# Patient Record
Sex: FEMALE | Race: WHITE | Employment: STUDENT | URBAN - METROPOLITAN AREA
[De-identification: names, ages, dates, MRNs, and addresses within clinical notes are randomized per-mention and may not be internally consistent; named-entity substitution may affect disease eponyms.]

---

## 2018-10-03 ENCOUNTER — TELEPHONE (OUTPATIENT)
Dept: FAMILY MEDICINE CLINIC | Facility: CLINIC | Age: 23
End: 2018-10-03

## 2018-10-04 ENCOUNTER — OFFICE VISIT (OUTPATIENT)
Dept: FAMILY MEDICINE CLINIC | Facility: CLINIC | Age: 23
End: 2018-10-04

## 2018-10-04 VITALS
BODY MASS INDEX: 19.84 KG/M2 | RESPIRATION RATE: 16 BRPM | HEIGHT: 63 IN | WEIGHT: 112 LBS | TEMPERATURE: 97 F | HEART RATE: 84 BPM | DIASTOLIC BLOOD PRESSURE: 60 MMHG | SYSTOLIC BLOOD PRESSURE: 96 MMHG

## 2018-10-04 DIAGNOSIS — Z91.018 FOOD ALLERGY: ICD-10-CM

## 2018-10-04 DIAGNOSIS — Z00.00 ROUTINE PHYSICAL EXAMINATION: Primary | ICD-10-CM

## 2018-10-04 DIAGNOSIS — Z11.51 SCREENING FOR HUMAN PAPILLOMAVIRUS (HPV): ICD-10-CM

## 2018-10-04 PROCEDURE — 88175 CYTOPATH C/V AUTO FLUID REDO: CPT | Performed by: FAMILY MEDICINE

## 2018-10-04 PROCEDURE — 99385 PREV VISIT NEW AGE 18-39: CPT | Performed by: FAMILY MEDICINE

## 2018-10-04 RX ORDER — LEVONORGESTREL AND ETHINYL ESTRADIOL 0.1-0.02MG
1 KIT ORAL DAILY
Qty: 1 PACKAGE | Refills: 11 | Status: SHIPPED | OUTPATIENT
Start: 2018-10-04 | End: 2019-03-19

## 2018-10-04 NOTE — PROGRESS NOTES
HPI:   Sanchez Gunn is a 26year old female who presents for a complete physical exam.  New to the office  Last pap:  No previous  Last mammogram:  n/a   Self exams:  /  Menses:  regular  Contraception:  none  Previous colonoscopy:  n/a  Previous DEXA: GENERAL: feels well otherwise  SKIN: denies any unusual skin lesions  EYES:no vision problems  LUNGS: denies shortness of breath  CARDIOVASCULAR: denies chest pain  GI: denies abdominal pain,  No constipation or diarrhea  : denies dysuria, vaginal disc with HPV Reflex Request      Meds & Refills for this Visit:  Requested Prescriptions     Signed Prescriptions Disp Refills   • Levonorgestrel-Ethinyl Estrad 0.1-20 MG-MCG Oral Tab 1 Package 11     Sig: Take 1 tablet by mouth daily.        Imaging & Consults

## 2018-10-05 ENCOUNTER — LAB ENCOUNTER (OUTPATIENT)
Dept: LAB | Age: 23
End: 2018-10-05
Attending: FAMILY MEDICINE
Payer: COMMERCIAL

## 2018-10-05 DIAGNOSIS — Z91.018 FOOD ALLERGY: ICD-10-CM

## 2018-10-05 PROCEDURE — 86003 ALLG SPEC IGE CRUDE XTRC EA: CPT

## 2018-10-05 PROCEDURE — 36415 COLL VENOUS BLD VENIPUNCTURE: CPT

## 2018-10-11 ENCOUNTER — TELEPHONE (OUTPATIENT)
Dept: FAMILY MEDICINE CLINIC | Facility: CLINIC | Age: 23
End: 2018-10-11

## 2018-10-11 NOTE — TELEPHONE ENCOUNTER
This AM noticed crusty right eye, that itches called [facility] told to go get saline solution no contacts, I told her that the best would be to go to a [facility] in the area she agreed to this plan

## 2018-10-11 NOTE — TELEPHONE ENCOUNTER
Patient calling from school in [place]. She believes she has pink eye. I asked her if she had contacted the health office at school she said she did but she were not helpful. She is wondering if she can get a script?   Please call back at [phone]

## 2018-10-16 DIAGNOSIS — Z91.018: Primary | ICD-10-CM

## 2018-10-16 RX ORDER — EPINEPHRINE 0.3 MG/.3ML
0.3 INJECTION SUBCUTANEOUS ONCE
Qty: 1 EACH | Refills: 0 | Status: SHIPPED | OUTPATIENT
Start: 2018-10-16 | End: 2018-10-16

## 2018-11-20 ENCOUNTER — TELEPHONE (OUTPATIENT)
Dept: FAMILY MEDICINE CLINIC | Facility: CLINIC | Age: 23
End: 2018-11-20

## 2018-11-20 DIAGNOSIS — Z91.018: ICD-10-CM

## 2018-11-20 DIAGNOSIS — L29.9 ITCHING: ICD-10-CM

## 2018-11-20 DIAGNOSIS — L23.9 ALLERGIC CONTACT DERMATITIS, UNSPECIFIED TRIGGER: Primary | ICD-10-CM

## 2018-11-20 NOTE — TELEPHONE ENCOUNTER
Pt's father called to ask if Dr Yokasta Martinez would order additional allergy labs to check for other possible allergies. Enviromental and food allergy Panel? Pt test positive to barley already. Pt father states Pt feels like she may be allergic to something else.

## 2018-11-27 ENCOUNTER — TELEPHONE (OUTPATIENT)
Dept: FAMILY MEDICINE CLINIC | Facility: CLINIC | Age: 23
End: 2018-11-27

## 2018-11-27 DIAGNOSIS — L70.9 ACNE, UNSPECIFIED ACNE TYPE: Primary | ICD-10-CM

## 2018-11-27 NOTE — TELEPHONE ENCOUNTER
Referral request to Dr. Analy Caldwell M.D.,Dermatology    Patient last seen by Dr. Eduar Parmar M.D. 10/4/2018  Office notes from Dr. Eduar Parmar M.D. 10/4/2018  Would like to consider ocps to control acne.   Has tried different products with derm

## 2018-11-27 NOTE — TELEPHONE ENCOUNTER
Left message on patient's mobile phone number that referral has been entered for Dr. Katerina Velasquez M.D.,dermatology

## 2018-12-20 ENCOUNTER — TELEPHONE (OUTPATIENT)
Dept: FAMILY MEDICINE CLINIC | Facility: CLINIC | Age: 23
End: 2018-12-20

## 2018-12-20 DIAGNOSIS — Z91.018 FOOD ALLERGY: Primary | ICD-10-CM

## 2018-12-20 NOTE — TELEPHONE ENCOUNTER
Referral request Dr. Lewis Quick M.D., Allergy and Immunology    Patient was seen by Dr. Houston Rutherford M.D. 10/4/2018  Office notes from Dr. Houston Rutherford M.D. 10/4/18  Would like allergy testing. Believes she has reaction to barley.  Had sip of IPA be

## 2019-02-12 ENCOUNTER — TELEPHONE (OUTPATIENT)
Dept: FAMILY MEDICINE CLINIC | Facility: CLINIC | Age: 24
End: 2019-02-12

## 2019-02-12 NOTE — TELEPHONE ENCOUNTER
Received medical records request from patient requesting records be sent to Erlin Gonzlaes M.D., [facility].. Records were printed and sent to Erlin Gonzales M.D., [facility] at [phone]

## 2019-05-31 RX ORDER — NORGESTIMATE AND ETHINYL ESTRADIOL 0.25-0.035
1 KIT ORAL DAILY
Qty: 3 PACKAGE | Refills: 2 | Status: CANCELLED | OUTPATIENT
Start: 2019-05-31

## 2019-09-30 RX ORDER — NORGESTIMATE AND ETHINYL ESTRADIOL 0.25-0.035
1 KIT ORAL DAILY
Qty: 1 PACKAGE | Refills: 0 | Status: SHIPPED | OUTPATIENT
Start: 2019-09-30 | End: 2019-10-22

## 2019-11-13 ENCOUNTER — TELEPHONE (OUTPATIENT)
Dept: FAMILY MEDICINE CLINIC | Facility: CLINIC | Age: 24
End: 2019-11-13

## 2019-11-13 DIAGNOSIS — Z13.0 SCREENING FOR BLOOD DISEASE: Primary | ICD-10-CM

## 2019-11-13 DIAGNOSIS — Z13.228 SCREENING FOR METABOLIC DISORDER: ICD-10-CM

## 2019-11-13 DIAGNOSIS — Z13.220 SCREENING FOR LIPID DISORDERS: ICD-10-CM

## 2019-11-13 DIAGNOSIS — Z13.29 SCREENING FOR THYROID DISORDER: ICD-10-CM

## 2019-11-13 RX ORDER — NORGESTIMATE AND ETHINYL ESTRADIOL 0.25-0.035
1 KIT ORAL
Qty: 28 TABLET | Refills: 0 | Status: SHIPPED | OUTPATIENT
Start: 2019-11-13 | End: 2019-11-23

## 2019-11-13 NOTE — TELEPHONE ENCOUNTER
Patient called and scheduled annual physical for 12/27 with Dr Ziyad Lester, states is away at school and would need a refill to get to appointment for her birth control

## 2019-11-13 NOTE — TELEPHONE ENCOUNTER
Please enter lab orders for the patient's upcoming physical appointment. Physical scheduled:    Your appointments     Date & Time Appointment Department [facility])    Dec 27, 2019  9:30 AM CST Physical - Established with Violeta Gibbs  [facility]

## 2019-11-25 ENCOUNTER — TELEPHONE (OUTPATIENT)
Dept: FAMILY MEDICINE CLINIC | Facility: CLINIC | Age: 24
End: 2019-11-25

## 2019-11-25 DIAGNOSIS — L70.9 ACNE, UNSPECIFIED ACNE TYPE: Primary | ICD-10-CM

## 2019-11-25 NOTE — TELEPHONE ENCOUNTER
Referral request Dr. Gabo Lantigua M.D.,Dermatology    Patient seen by Dr. Qamar Blevins M.D. 10/4/18  Office notes from Dr. Qamar Blevins M.D. 10/4/18  Would like to consider ocps to control acne.

## 2019-11-25 NOTE — TELEPHONE ENCOUNTER
Patient notified we have placed referral for Dr. Dean Chadwick M.D. She has an annual physical scheduled with Dr. Mague Looney M.D.in December.

## 2019-12-23 NOTE — TELEPHONE ENCOUNTER
Requested Prescriptions     Pending Prescriptions Disp Refills   • [id number] [id number] 0.25-35 MG-MCG Oral Tab [Pharmacy Med Name: SPRINTEC 28 DAY TABLET] 28 tablet 0     Sig: TAKE 1 TABLET BY MOUTH EVERY DAY     LOV 10/04/2018      Patient was asked to follow-up

## 2019-12-27 ENCOUNTER — OFFICE VISIT (OUTPATIENT)
Dept: FAMILY MEDICINE CLINIC | Facility: CLINIC | Age: 24
End: 2019-12-27

## 2019-12-27 ENCOUNTER — LAB ENCOUNTER (OUTPATIENT)
Dept: LAB | Age: 24
End: 2019-12-27
Attending: NURSE PRACTITIONER
Payer: COMMERCIAL

## 2019-12-27 VITALS
RESPIRATION RATE: 16 BRPM | BODY MASS INDEX: 20.65 KG/M2 | WEIGHT: 118 LBS | HEIGHT: 63.5 IN | TEMPERATURE: 98 F | HEART RATE: 72 BPM | SYSTOLIC BLOOD PRESSURE: 96 MMHG | DIASTOLIC BLOOD PRESSURE: 66 MMHG

## 2019-12-27 DIAGNOSIS — Z13.0 SCREENING FOR BLOOD DISEASE: ICD-10-CM

## 2019-12-27 DIAGNOSIS — Z13.228 SCREENING FOR METABOLIC DISORDER: ICD-10-CM

## 2019-12-27 DIAGNOSIS — Z13.29 SCREENING FOR THYROID DISORDER: ICD-10-CM

## 2019-12-27 DIAGNOSIS — Z00.00 ROUTINE GENERAL MEDICAL EXAMINATION AT A HEALTH CARE FACILITY: Primary | ICD-10-CM

## 2019-12-27 DIAGNOSIS — Z13.220 SCREENING FOR LIPID DISORDERS: ICD-10-CM

## 2019-12-27 PROCEDURE — 85025 COMPLETE CBC W/AUTO DIFF WBC: CPT

## 2019-12-27 PROCEDURE — 90471 IMMUNIZATION ADMIN: CPT | Performed by: FAMILY MEDICINE

## 2019-12-27 PROCEDURE — 84443 ASSAY THYROID STIM HORMONE: CPT

## 2019-12-27 PROCEDURE — 90686 IIV4 VACC NO PRSV 0.5 ML IM: CPT | Performed by: FAMILY MEDICINE

## 2019-12-27 PROCEDURE — 36415 COLL VENOUS BLD VENIPUNCTURE: CPT

## 2019-12-27 PROCEDURE — 80053 COMPREHEN METABOLIC PANEL: CPT

## 2019-12-27 PROCEDURE — 99395 PREV VISIT EST AGE 18-39: CPT | Performed by: FAMILY MEDICINE

## 2019-12-27 PROCEDURE — 80061 LIPID PANEL: CPT

## 2019-12-27 RX ORDER — NORGESTIMATE AND ETHINYL ESTRADIOL 0.25-0.035
1 KIT ORAL
Qty: 3 PACKAGE | Refills: 4 | Status: SHIPPED | OUTPATIENT
Start: 2019-12-27

## 2019-12-27 NOTE — PROGRESS NOTES
HPI:   Chika Buenrostro is a 27year old female who presents for a complete physical exam.  Last pap:  2018  Last mammogram:  n/a   Contraception:  None, not sexually active  Previous colonoscopy:  n/a  Previous DEXA:  n/a.   Current or previous treatment: dermatitis     Acne, unspecified acne type    No past medical history on file. No past surgical history on file.    Family History   Problem Relation Age of Onset   • Heart Disease Maternal Grandfather    • Hypertension Father    • Mental Disorder Materna control pills for now to see if periods regulate.   Orders Placed This Encounter      Flulaval 6 months and older 0.5 ml Quad PF [82059]      Meds & Refills for this Visit:  Requested Prescriptions     Signed Prescriptions Disp Refills   • Norgestimate-Eth

## 2020-01-10 ENCOUNTER — PATIENT MESSAGE (OUTPATIENT)
Dept: FAMILY MEDICINE CLINIC | Facility: CLINIC | Age: 25
End: 2020-01-10

## 2020-01-10 NOTE — TELEPHONE ENCOUNTER
From: Cristo Obregon  To: Robbie Mi MD  Sent: 1/10/2020 2:34 PM CST  Subject: Non-Urgent Karis Pride,    I just got my period again & I'm in the middle of a pack.  I remember you saying something about ending a pack if that

## 2020-03-03 ENCOUNTER — TELEPHONE (OUTPATIENT)
Dept: FAMILY MEDICINE CLINIC | Facility: CLINIC | Age: 25
End: 2020-03-03

## 2020-03-03 DIAGNOSIS — Z30.9 ENCOUNTER FOR CONTRACEPTIVE MANAGEMENT, UNSPECIFIED TYPE: ICD-10-CM

## 2020-03-03 RX ORDER — NORGESTIMATE AND ETHINYL ESTRADIOL 7DAYSX3 28
1 KIT ORAL DAILY
Qty: 84 TABLET | Refills: 0 | Status: CANCELLED | OUTPATIENT
Start: 2020-03-03 | End: 2020-03-31

## 2020-03-03 RX ORDER — NORGESTIMATE AND ETHINYL ESTRADIOL 7DAYSX3 28
1 KIT ORAL DAILY
Qty: 84 TABLET | Refills: 0 | Status: SHIPPED | OUTPATIENT
Start: 2020-03-03 | End: 2020-03-31

## 2020-03-03 NOTE — TELEPHONE ENCOUNTER
Pt called stated she had requested birth control medication to be sent to [id number] [place] [address] and medication was sent in error to [place].  Please send Norgestim-Eth Estrad Triphasic (ORTHO TRI-CYCLEN, 28,) 0.18/0.215/0.25 MG-35 MCG Oral Tab to [facility]

## 2020-04-23 DIAGNOSIS — Z30.9 ENCOUNTER FOR CONTRACEPTIVE MANAGEMENT, UNSPECIFIED TYPE: ICD-10-CM

## 2020-04-23 RX ORDER — NORGESTIMATE AND ETHINYL ESTRADIOL 7DAYSX3 28
1 KIT ORAL DAILY
Qty: 84 TABLET | Refills: 0 | OUTPATIENT
Start: 2020-04-23 | End: 2020-05-21

## 2020-04-23 NOTE — TELEPHONE ENCOUNTER
Reviewed Medications Dispensed list  Patient has already picked up on 3/3/20 84 day supply  TRI-SPRINTEC 0.18/0.215/0.25 MG-35 TRI-SPRINTEC 0.18/0.215/0.25 MG-35 MCG TABS

## 2020-04-23 NOTE — TELEPHONE ENCOUNTER
Hello! I think you sent this in a month or so ago, but I was never able to pick it up. I was wondering if you could send it in again so I could start it? Thanks!   Jeanne Taylor

## 2020-11-23 ENCOUNTER — TELEPHONE (OUTPATIENT)
Dept: FAMILY MEDICINE CLINIC | Facility: CLINIC | Age: 25
End: 2020-11-23

## 2020-11-23 DIAGNOSIS — L70.8 OTHER ACNE: ICD-10-CM

## 2020-11-23 DIAGNOSIS — L25.9 CHRONIC CONTACT DERMATITIS: Primary | ICD-10-CM

## 2020-11-23 NOTE — TELEPHONE ENCOUNTER
Referral request Dr. Johnnie Pena M.D.     Patient seen by Dr. Humphrey Maldonado M.D. 12/27/2019  Patient Active Problem List:     Contact dermatitis     Acne, unspecified acne type

## 2021-12-15 ENCOUNTER — TELEPHONE (OUTPATIENT)
Dept: FAMILY MEDICINE CLINIC | Facility: CLINIC | Age: 26
End: 2021-12-15

## 2021-12-15 DIAGNOSIS — L81.0 POSTINFLAMMATORY HYPERPIGMENTATION: ICD-10-CM

## 2021-12-15 DIAGNOSIS — L70.0 ACNE VULGARIS: Primary | ICD-10-CM

## 2021-12-15 NOTE — TELEPHONE ENCOUNTER
Referral request Dr. Karen Rodarte M.D.     Office notes from [place], [place] 5/12/21  1. Adult Acne to the Face - Few small papules with 1 cyst to the chin today  -The diagnosis and treatment options were discussed and questions were answered.   -Risks, benefits

## 2021-12-23 ENCOUNTER — LAB ENCOUNTER (OUTPATIENT)
Dept: LAB | Facility: HOSPITAL | Age: 26
End: 2021-12-23
Attending: FAMILY MEDICINE
Payer: COMMERCIAL

## 2021-12-23 ENCOUNTER — TELEPHONE (OUTPATIENT)
Dept: FAMILY MEDICINE CLINIC | Facility: CLINIC | Age: 26
End: 2021-12-23

## 2021-12-23 DIAGNOSIS — R09.81 NASAL CONGESTION: Primary | ICD-10-CM

## 2021-12-23 DIAGNOSIS — R09.81 NASAL CONGESTION: ICD-10-CM

## 2021-12-23 NOTE — TELEPHONE ENCOUNTER
Mom called back stating she insist Pt just get an order for Covid testing. Explained to mom that Pt has not been in over in 2 years and also has sinus issues- symptoms may be sinus infection or covid and advise an evaluation for testing and treatment.    Licha Lang

## 2021-12-23 NOTE — TELEPHONE ENCOUNTER
Pt having runny nose and headache (since last night) Requesting an order for Covid (no appts available)

## 2021-12-23 NOTE — TELEPHONE ENCOUNTER
Pt's mom calling back and requesting to speak to a nurse on message given to her daughter for Covid.  ([phone])

## 2021-12-23 NOTE — TELEPHONE ENCOUNTER
Pt c/o runny nose, congestion and headaches x 2 days. No fevers, no difficulty breathing.  Advised Pt to go to Walk In clinic for evaluation

## 2022-01-19 NOTE — TELEPHONE ENCOUNTER
[address]     Department of Internal Medicine - Staff Internal Medicine Teaching Service          ADMISSION NOTE/HISTORY AND PHYSICAL EXAMINATION   Date: 1/19/2022  Patient Name: Carolina Murphy  Date of admission: 1/18/2022  9:03 PM  YOB: 1935  PCP: Hayden Archibald MD  History Obtained From:  Patient CHART REVIEW     [address]     Chief complaint: worsening sob     HISTORY OF PRESENTING ILLNESS     The patient is a pleasant 82 y.o. female with past medical history of CAD s/p PTCA/PEPITO to LAD on 10/8/2012  EF 45, thyroid disease, hypertension, diabetes, hyperlipidemia. Was recently admitted for lightheadedness/fatigue and found to have COVID and  UTI which was  with Keflex. Patient was discharged on January 11 with covid boostwer      PER ED notes ,patient was having vomiting ,diarehea  and failure to thrive at home and was brought in to hospital .in the ED patient was hypoxic 88 % and was put on 2 l nc with improvement of oxygen to >92 .   hemodynamically stable with bp in 116/52, afebrile, rr 20    On my evaluation at around 6 :00 am .,patient was wearing bipap . Patient stated she usully uses 3 l of oxygen at home for her emphysema and that wasn't helping her  So she had to come to hospital .   Patient denied any chest pain, cough,phlegm ,diarrhea,constipation ,flu like symptoms. CXR did show worsening of pulmonary opacities as compared to cxr on jan 8th    Review of Systems:  Review of Systems   Respiratory: Positive for shortness of breath. Negative for cough, wheezing and stridor. Cardiovascular: Positive for leg swelling. Negative for chest pain. Gastrointestinal: Negative for abdominal pain.          PAST MEDICAL HISTORY     Past Medical History:   Diagnosis Date    Arthritis     [facility]rial flutter [facility]     CAD (coronary artery disease)     CHF (congestive heart failure) (HCC)     Diabetes ([facility]     Diabetes mellitus ([facility]     Hyperlipidemia     LMOM for pt to call back and confirm her NEW PT appt w/Dr Jean-Claude Sandoval on 10/4/18 Hypertension     Psychiatric problem     Thyroid cancer ([facility]     S/P thyroidectomy; on synthroid    Thyroid disease     hypothyroid       PAST SURGICAL HISTORY     Past Surgical History:   Procedure Laterality Date    APPENDECTOMY      BACK SURGERY      CHOLECYSTECTOMY      CORONARY ANGIOPLASTY WITH STENT PLACEMENT      HYSTERECTOMY      THYROIDECTOMY      UPPER GASTROINTESTINAL ENDOSCOPY  4/14/2021    EGD BIOPSY performed by Dang Roman MD at [facility] ENDOSCOPY  4/14/2021    EGD CONTROL HEMORRHAGE performed by Dang Roman MD at [facility] extract and Tape [place] tape]    MEDICATIONS PRIOR TO ADMISSION     Prior to Admission medications    Medication Sig Start Date End Date Taking?  Authorizing Provider   pantoprazole (PROTONIX) 40 MG tablet Take 1 tablet by mouth daily 1/11/22   Yunior Agustin MD   levothyroxine (SYNTHROID) 150 MCG tablet Take 1 tablet by mouth Daily 1/12/22   Yunior Agustin MD   furosemide (LASIX) 40 MG tablet Take 0.5 tablets by mouth daily 1/11/22   Yunior Agustin MD   metoprolol tartrate (LOPRESSOR) 25 MG tablet Take 0.5 tablets by mouth 2 times daily 1/11/22   Yunior Ba MD   FEROSUL 325 (65 Fe) MG tablet TAKE 1 TABLET BY MOUTH 2 TIMES DAILY 12/17/21   Lupe Jones MD   insulin glargine (BASAGLAR KWIKPEN) 100 UNIT/ML injection pen Inject 42 Units into the skin nightly 12/8/21   STEFAN Goodwin CNP   Continuous Blood Gluc Sensor (FREESTYLE KERRY 2 SENSOR) MISC 1 Device by Does not apply route every 14 days 11/23/21   STEFAN Goodwin CNP   Continuous Blood Gluc  (FREESTYLE KERRY 2 READER) CLAUDIO 1 Device by Does not apply route daily 11/23/21   STEFAN Goodwin CNP   Cyanocobalamin (VITAMIN B-12) 1000 MCG extended release tablet TAKE 1 TAB BY MOUTH ONCE A DAY  10/28/21   Lupe Jones MD   atorvastatin (LIPITOR) 80 MG tablet TAKE 1 TABLET BY MOUTH DAILY  8/10/21   Evelina Christopher MD   vitamin D3 (CHOLECALCIFEROL) 25 MCG (1000 UT) TABS tablet TAKE 1 TAB BY MOUTH ONCE A DAY  8/10/21   Evelina Christopher MD   clopidogrel (PLAVIX) 75 MG tablet TAKE 1 TAB BY MOUTH ONCE A DAY 7/29/21   Evelina Christopher MD   loratadine (CLARITIN) 10 MG tablet TAKE 1 TABLET BY MOUTH TWICE A DAY AS NEEDED 7/8/21   Randal Hand MD   sertraline (ZOLOFT) 100 MG tablet Take 1 tablet by Mouth Once a Day 6/17/21   Evelina Christopher MD   SITagliptin (JANUVIA) 100 MG tablet TAKE 1 TAB BY MOUTH ONCE A DAY 6/3/21   Evelina Christopher MD   blood glucose monitor strips Test 2 times a day & as needed for symptoms of irregular blood glucose. Dispense sufficient amount for indicated testing frequency plus additional to accommodate PRN testing needs. 3/29/21   Evelina Christopher MD   Continuous Blood Gluc  (FREESTYLE KERRY 14 DAY READER) CLAUDIO 1 Device by Does not apply route 4 times daily 11/30/20   Evelina Christopher MD   Continuous Blood Gluc Sensor (FREESTYLE KERRY 2 SENSOR SYSTM) MISC 4 Devices by Does not apply route every 7 days 11/30/20   Evelina Christopher MD   Lancets MISC 1 each by Does not apply route 2 times daily 7/5/19   Evelina Christopher MD   nystatin (MYCOSTATIN) 579995 UNIT/GM cream Apply topically 2 times daily.  5/7/18   Evelina Christopher MD   docusate sodium (COLACE) 100 MG capsule Take 1 capsule by mouth daily as needed for Constipation 8/18/17   Evelina Christopher MD   aspirin 81 MG chewable tablet Take 1 tablet by mouth daily 9/5/16   Lorraine Garcia MD       SOCIAL HISTORY     Tobacco: none  Alcohol: none  Illicits:none   Per chart [facility]    FAMILY HISTORY     Family History   Problem Relation Age of Onset    Cancer Mother     Cancer Father     Cancer Brother     Diabetes Brother        PHYSICAL EXAM     Vitals: BP (!) 113/53   Pulse 58   Temp 98 °F (36.7 °C) (Oral)   Resp 18   Ht 5' 2\" (1.575 m)   Wt 200 lb (90.7 kg)   SpO2 97%   BMI 36.58 kg/m²   Tmax: Temp (24hrs), Av.7 °F (36.5 °C), Min:97.5 °F (36.4 °C), Max:98 °F (36.7 °C)    Last Body weight:   Wt Readings from Last 3 Encounters:   22 200 lb (90.7 kg)   22 199 lb 4.7 oz (90.4 kg)   21 200 lb (90.7 kg)     Body Mass Index : Body mass index is 36.58 kg/m². PHYSICAL EXAMINATION:  Constitutional: This is a well developed, well nourished, 35-39.9 - Obesity Grade II 82y.o. year old female who is alert, oriented, cooperative and in no apparent distress. Head:normocephalic and atraumatic. EENT:  PERRLA. No conjunctival injections. Septum was midline, mucosa was without erythema, exudates or cobblestoning. No thrush was noted. Neck: Supple without thyromegaly. No elevated JVP. Trachea was midline. Respiratory: chest decrease sound overall likely due to body habitous ,on bipap   Cardiovascular: Regular without murmur, clicks, gallops or rubs. Abdomen: Slightly rounded and soft without organomegaly. No rebound, rigidity or guarding was appreciated. Lymphatic: No lymphadenopathy. Musculoskeletal: Normal curvature of the spine. No gross muscle weakness. Extremities:  Pedal edema   Skin:  Warm and dry. Good color, turgor and pigmentation. No lesions or scars.   No cyanosis or clubbing  Neurological/Psychiatric: The patient's general behavior, level of consciousness, thought content and emotional status is normal.          INVESTIGATIONS     Laboratory Testing:     Recent Results (from the past 24 hour(s))   CBC Auto Differential    Collection Time: 22  9:12 PM   Result Value Ref Range    WBC 6.4 3.5 - 11.3 k/uL    RBC 4.26 3.95 - 5.11 m/uL    Hemoglobin 11.9 11.9 - 15.1 g/dL    Hematocrit 36.2 (L) 36.3 - 47.1 %    MCV 85.0 82.6 - 102.9 fL    MCH 27.9 25.2 - 33.5 pg    MCHC 32.9 28.4 - 34.8 g/dL    RDW 14.1 11.8 - 14.4 %    Platelets See Reflexed IPF Result 138 - 453 k/uL    MPV NOT REPORTED 8.1 - 13.5 fL    NRBC Automated 0.0 0.0 per 100 WBC Differential Type NOT REPORTED     WBC Morphology NOT REPORTED     RBC Morphology NOT REPORTED     Platelet Estimate NOT REPORTED     Immature Granulocytes 5 (H) 0 %    Seg Neutrophils 68 (H) 36 - 66 %    Lymphocytes 21 (L) 24 - 44 %    Monocytes 6 1 - 7 %    Eosinophils % 0 (L) 1 - 4 %    Basophils 0 0 - 2 %    Absolute Immature Granulocyte 0.32 (H) 0.00 - 0.30 k/uL    Segs Absolute 4.36 1.8 - 7.7 k/uL    Absolute Lymph # 1.34 1.0 - 4.8 k/uL    Absolute Mono # 0.38 0.1 - 0.8 k/uL    Absolute Eos # 0.00 0.0 - 0.4 k/uL    Basophils Absolute 0.00 0.0 - 0.2 k/uL    Morphology Normal    Basic Metabolic Panel w/ Reflex to MG    Collection Time: 01/18/22  9:12 PM   Result Value Ref Range    Glucose 226 (H) 70 - 99 mg/dL    BUN 26 (H) 8 - 23 mg/dL    CREATININE 1.34 (H) 0.50 - 0.90 mg/dL    Bun/Cre Ratio NOT REPORTED 9 - 20    Calcium 8.1 (L) 8.6 - 10.4 mg/dL    Sodium 132 (L) 135 - 144 mmol/L    Potassium 4.1 3.7 - 5.3 mmol/L    Chloride 94 (L) 98 - 107 mmol/L    CO2 23 20 - 31 mmol/L    Anion Gap 15 9 - 17 mmol/L    GFR Non-African American 38 (L) >60 mL/min    GFR  46 (L) >60 mL/min    GFR Comment          GFR Staging NOT REPORTED    Hepatic Function Panel    Collection Time: 01/18/22  9:12 PM   Result Value Ref Range    Albumin 3.6 3.5 - 5.2 g/dL    Alkaline Phosphatase 164 (H) 35 - 104 U/L    ALT 36 (H) 5 - 33 U/L    AST 39 (H) <32 U/L    Total Bilirubin 0.49 0.3 - 1.2 mg/dL    Bilirubin, Direct 0.17 <0.31 mg/dL    Bilirubin, Indirect 0.32 0.00 - 1.00 mg/dL    Total Protein 7.2 6.4 - 8.3 g/dL    Globulin NOT REPORTED 1.5 - 3.8 g/dL    Albumin/Globulin Ratio 1.0 1.0 - 2.5   Troponin    Collection Time: 01/18/22  9:12 PM   Result Value Ref Range    Troponin, High Sensitivity 31 (H) 0 - 14 ng/L    Troponin T NOT REPORTED <0.03 ng/mL    Troponin Interp NOT REPORTED    Brain Natriuretic Peptide    Collection Time: 01/18/22  9:12 PM   Result Value Ref Range    Pro- (H) <300 pg/mL    BNP Interpretation NOT REPORTED    Immature Platelet Fraction    Collection Time: 01/18/22  9:12 PM   Result Value Ref Range    Platelet, Immature Fraction 3.9 1.1 - 10.3 %    Platelet, Fluorescence 187 138 - 453 k/uL   EKG 12 Lead    Collection Time: 01/18/22  9:14 PM   Result Value Ref Range    Ventricular Rate 67 BPM    Atrial Rate 67 BPM    P-R Interval 232 ms    QRS Duration 162 ms    Q-T Interval 466 ms    QTc Calculation (Bazett) 492 ms    P Axis 23 degrees    R Axis -74 degrees    T Axis 38 degrees   Troponin    Collection Time: 01/18/22 11:01 PM   Result Value Ref Range    Troponin, High Sensitivity 27 (H) 0 - 14 ng/L    Troponin T NOT REPORTED <0.03 ng/mL    Troponin Interp NOT REPORTED    Ferritin    Collection Time: 01/19/22  2:33 AM   Result Value Ref Range    Ferritin 243 (H) 13 - 150 ug/L   D-Dimer, Quantitative    Collection Time: 01/19/22  2:33 AM   Result Value Ref Range    D-Dimer, Quant 1.42 mg/L FEU       Imaging:   XR CHEST PORTABLE    Result Date: 1/18/2022  1. New bilateral pulmonary opacities, consistent with COVID-19 pulmonary disease given the clinical history 2. Cardiomegaly     XR HIP 2-3 VW W PELVIS RIGHT    Result Date: 1/18/2022  No acute osseous abnormality       ASSESSMENT & PLAN     ASSESSMENT / PLAN:     1. Acute hypoxic respiratory failure secondary to COVID: New pulmonary opacities when compared to x-ray of January 8. Did not receive steroids,/Actemra/monoclonal antibodies during last admission. On 2 L nasal cannula. Follow D-dimer, ferritin, LDH, C-reactive protein, Pro-Kyle.  2. COVID superimposed PNA?: Follow respiratory culture, Legionella, strep. ID CONSULT   3. BRIA with baseline creatinine : Continue gentle oral hydration, intake output record . monitor electrolytes. 4. NSTEMI type II likely due to demand ischemia: Last echo 1/10/2022 not read. 2016 EF 65%. 5. History of UTI on previous admission: Completed dose of Keflex.   6. History of chronic diastolic heart failure grade 1 on 20 mg Lasix, Lopressor 25 at home, Lopressor 25.  7. History of type 2 diabetes on Lantus 42 units at home. Currently glucose stable. Will assess and add as appropriate  8. Hypertension on Lasix 20 mg and Lopressor 25 mg at home. Currently normotensive. Resume as tolerated. 9. Dyslipidemia on atorvastatin 80 mg  10. BHASKAR on cpap at home  11. Copd with baseline use of 3 l oxygen at home accoridng to patient . 12. history of CAD s/p PTCA/PEPITO to LAD on 10/8/2012 ; on aspirin plavix both ??  13. Hypothyroidism ; last tsh 15 on 1/8/2022 after which thyroxine dose was increased to 150      DVT ppx:hepairn   GI ppx: none     PT/OT/SW on board   Discharge Planning: on board     Shari Watkins MD  Internal Medicine Resident, PGY-2  [address] [place], [place]  1/19/2022, 5:14 AM

## 2022-03-28 PROBLEM — N64.52 NIPPLE DISCHARGE: Status: ACTIVE | Noted: 2020-09-28

## 2022-03-28 RX ORDER — NORGESTIMATE AND ETHINYL ESTRADIOL 7DAYSX3 28
KIT ORAL
COMMUNITY
Start: 2021-12-22

## 2022-05-25 ENCOUNTER — OFFICE VISIT (OUTPATIENT)
Dept: FAMILY MEDICINE CLINIC | Facility: CLINIC | Age: 27
End: 2022-05-25
Payer: COMMERCIAL

## 2022-05-25 VITALS
HEIGHT: 63.39 IN | RESPIRATION RATE: 16 BRPM | DIASTOLIC BLOOD PRESSURE: 60 MMHG | WEIGHT: 111 LBS | HEART RATE: 80 BPM | BODY MASS INDEX: 19.42 KG/M2 | TEMPERATURE: 97 F | OXYGEN SATURATION: 95 % | SYSTOLIC BLOOD PRESSURE: 90 MMHG

## 2022-05-25 DIAGNOSIS — Z00.00 WELL WOMAN EXAM (NO GYNECOLOGICAL EXAM): Primary | ICD-10-CM

## 2022-05-25 DIAGNOSIS — F43.21 GRIEF: ICD-10-CM

## 2022-05-25 DIAGNOSIS — N92.6 IRREGULAR MENSES: ICD-10-CM

## 2022-05-25 PROCEDURE — 3078F DIAST BP <80 MM HG: CPT | Performed by: NURSE PRACTITIONER

## 2022-05-25 PROCEDURE — 3008F BODY MASS INDEX DOCD: CPT | Performed by: NURSE PRACTITIONER

## 2022-05-25 PROCEDURE — 3074F SYST BP LT 130 MM HG: CPT | Performed by: NURSE PRACTITIONER

## 2022-05-25 PROCEDURE — 99395 PREV VISIT EST AGE 18-39: CPT | Performed by: NURSE PRACTITIONER

## 2022-06-07 ENCOUNTER — LABORATORY ENCOUNTER (OUTPATIENT)
Dept: LAB | Age: 27
End: 2022-06-07
Attending: NURSE PRACTITIONER
Payer: COMMERCIAL

## 2022-06-07 DIAGNOSIS — Z00.00 WELL WOMAN EXAM (NO GYNECOLOGICAL EXAM): ICD-10-CM

## 2022-06-07 LAB
ALBUMIN SERPL-MCNC: 3.5 G/DL (ref 3.4–5)
ALBUMIN/GLOB SERPL: 1 {RATIO} (ref 1–2)
ALP LIVER SERPL-CCNC: 41 U/L
ALT SERPL-CCNC: 17 U/L
ANION GAP SERPL CALC-SCNC: 5 MMOL/L (ref 0–18)
AST SERPL-CCNC: 10 U/L (ref 15–37)
BASOPHILS # BLD AUTO: 0.03 X10(3) UL (ref 0–0.2)
BASOPHILS NFR BLD AUTO: 0.6 %
BILIRUB SERPL-MCNC: 0.5 MG/DL (ref 0.1–2)
BUN BLD-MCNC: 9 MG/DL (ref 7–18)
CALCIUM BLD-MCNC: 9.4 MG/DL (ref 8.5–10.1)
CHLORIDE SERPL-SCNC: 106 MMOL/L (ref 98–112)
CO2 SERPL-SCNC: 28 MMOL/L (ref 21–32)
CREAT BLD-MCNC: 0.88 MG/DL
EOSINOPHIL # BLD AUTO: 0.14 X10(3) UL (ref 0–0.7)
EOSINOPHIL NFR BLD AUTO: 2.8 %
ERYTHROCYTE [DISTWIDTH] IN BLOOD BY AUTOMATED COUNT: 12.2 %
FASTING STATUS PATIENT QL REPORTED: YES
GLOBULIN PLAS-MCNC: 3.6 G/DL (ref 2.8–4.4)
GLUCOSE BLD-MCNC: 82 MG/DL (ref 70–99)
HCT VFR BLD AUTO: 43.2 %
HGB BLD-MCNC: 14.5 G/DL
IMM GRANULOCYTES # BLD AUTO: 0.01 X10(3) UL (ref 0–1)
IMM GRANULOCYTES NFR BLD: 0.2 %
LYMPHOCYTES # BLD AUTO: 2.23 X10(3) UL (ref 1–4)
LYMPHOCYTES NFR BLD AUTO: 44.8 %
MCH RBC QN AUTO: 31.8 PG (ref 26–34)
MCHC RBC AUTO-ENTMCNC: 33.6 G/DL (ref 31–37)
MCV RBC AUTO: 94.7 FL
MONOCYTES # BLD AUTO: 0.31 X10(3) UL (ref 0.1–1)
MONOCYTES NFR BLD AUTO: 6.2 %
NEUTROPHILS # BLD AUTO: 2.26 X10 (3) UL (ref 1.5–7.7)
NEUTROPHILS # BLD AUTO: 2.26 X10(3) UL (ref 1.5–7.7)
NEUTROPHILS NFR BLD AUTO: 45.4 %
OSMOLALITY SERPL CALC.SUM OF ELEC: 286 MOSM/KG (ref 275–295)
PLATELET # BLD AUTO: 264 10(3)UL (ref 150–450)
POTASSIUM SERPL-SCNC: 4.2 MMOL/L (ref 3.5–5.1)
PROT SERPL-MCNC: 7.1 G/DL (ref 6.4–8.2)
RBC # BLD AUTO: 4.56 X10(6)UL
SODIUM SERPL-SCNC: 139 MMOL/L (ref 136–145)
TSI SER-ACNC: 2.81 MIU/ML (ref 0.36–3.74)
WBC # BLD AUTO: 5 X10(3) UL (ref 4–11)

## 2022-06-07 PROCEDURE — 84443 ASSAY THYROID STIM HORMONE: CPT

## 2022-06-07 PROCEDURE — 36415 COLL VENOUS BLD VENIPUNCTURE: CPT

## 2022-06-07 PROCEDURE — 85025 COMPLETE CBC W/AUTO DIFF WBC: CPT

## 2022-06-07 PROCEDURE — 80053 COMPREHEN METABOLIC PANEL: CPT

## 2022-07-08 ENCOUNTER — PATIENT OUTREACH (OUTPATIENT)
Dept: FAMILY MEDICINE CLINIC | Facility: CLINIC | Age: 27
End: 2022-07-08

## 2022-07-08 NOTE — PROGRESS NOTES
Pt was seen 5/25/22 by Hever Post NP, was referred to Dr. Debo Allen for eval and due for PAP testing, which we discussed. Took last pill today. Did get better with blood in the urine while on antibiotic. But now regressing and noting more blood again. No burning with urination or to urethral area. Will see blood drip into the water, and on toilet. Noted blood with wiping. Per daughter, does seem like more substantial amount. Daughter unsure if maybe patient has a sore down there? No Sob, lightheaded/dizzy. Temp was good. O2 level, BP good. No abdominal pain. Advised Dr. Izquierdo is not in today, they are okay to wait for his return tomorrow to review. Advised to call back today if symptoms worsen.     To Dr. Izquierdo - please advise, patient to come in for appointment or at this point needs to see urology for possible cystoscopy?

## 2022-12-21 ENCOUNTER — TELEPHONE (OUTPATIENT)
Dept: FAMILY MEDICINE CLINIC | Facility: CLINIC | Age: 27
End: 2022-12-21

## 2022-12-21 NOTE — TELEPHONE ENCOUNTER
Left message for patient that mental health does not require a referral. As long as she sees an in network mental health provider she should be fine.

## 2023-01-27 ENCOUNTER — HOSPITAL ENCOUNTER (EMERGENCY)
Facility: HOSPITAL | Age: 28
Discharge: HOME | End: 2023-01-28
Attending: EMERGENCY MEDICINE
Payer: COMMERCIAL

## 2023-01-27 DIAGNOSIS — R42 VERTIGO: Primary | ICD-10-CM

## 2023-01-27 PROCEDURE — 93010 ELECTROCARDIOGRAM REPORT: CPT

## 2023-01-27 PROCEDURE — 99284 EMERGENCY DEPT VISIT MOD MDM: CPT

## 2023-01-27 PROCEDURE — 99285 EMERGENCY DEPT VISIT HI MDM: CPT

## 2023-01-28 ENCOUNTER — APPOINTMENT (OUTPATIENT)
Dept: CT IMAGING | Facility: HOSPITAL | Age: 28
End: 2023-01-28
Attending: EMERGENCY MEDICINE
Payer: COMMERCIAL

## 2023-01-28 VITALS
RESPIRATION RATE: 20 BRPM | HEIGHT: 63 IN | HEART RATE: 76 BPM | OXYGEN SATURATION: 99 % | TEMPERATURE: 98 F | BODY MASS INDEX: 20.37 KG/M2 | SYSTOLIC BLOOD PRESSURE: 101 MMHG | DIASTOLIC BLOOD PRESSURE: 73 MMHG | WEIGHT: 115 LBS

## 2023-01-28 LAB
ALBUMIN SERPL-MCNC: 3.8 G/DL (ref 3.4–5)
ALBUMIN/GLOB SERPL: 1.2 {RATIO} (ref 1–2)
ALP LIVER SERPL-CCNC: 48 U/L
ALT SERPL-CCNC: 22 U/L
AMPHET UR QL SCN: NEGATIVE
ANION GAP SERPL CALC-SCNC: 6 MMOL/L (ref 0–18)
AST SERPL-CCNC: 20 U/L (ref 15–37)
ATRIAL RATE: 72 BPM
B-HCG UR QL: NEGATIVE
BASOPHILS # BLD AUTO: 0.04 X10(3) UL (ref 0–0.2)
BASOPHILS NFR BLD AUTO: 0.4 %
BENZODIAZ UR QL SCN: NEGATIVE
BILIRUB SERPL-MCNC: 0.4 MG/DL (ref 0.1–2)
BILIRUB UR QL STRIP.AUTO: NEGATIVE
BUN BLD-MCNC: 8 MG/DL (ref 7–18)
CALCIUM BLD-MCNC: 9 MG/DL (ref 8.5–10.1)
CHLORIDE SERPL-SCNC: 105 MMOL/L (ref 98–112)
CLARITY UR REFRACT.AUTO: CLEAR
CO2 SERPL-SCNC: 28 MMOL/L (ref 21–32)
COCAINE UR QL: NEGATIVE
COLOR UR AUTO: YELLOW
CREAT BLD-MCNC: 0.74 MG/DL
CREAT UR-SCNC: 85.3 MG/DL
EOSINOPHIL # BLD AUTO: 0.12 X10(3) UL (ref 0–0.7)
EOSINOPHIL NFR BLD AUTO: 1.2 %
ERYTHROCYTE [DISTWIDTH] IN BLOOD BY AUTOMATED COUNT: 12.1 %
GFR SERPLBLD BASED ON 1.73 SQ M-ARVRAT: 115 ML/MIN/1.73M2 (ref 60–?)
GLOBULIN PLAS-MCNC: 3.3 G/DL (ref 2.8–4.4)
GLUCOSE BLD-MCNC: 102 MG/DL (ref 70–99)
GLUCOSE BLD-MCNC: 104 MG/DL (ref 70–99)
GLUCOSE UR STRIP.AUTO-MCNC: NEGATIVE MG/DL
HCT VFR BLD AUTO: 39.3 %
HGB BLD-MCNC: 14.3 G/DL
IMM GRANULOCYTES # BLD AUTO: 0.03 X10(3) UL (ref 0–1)
IMM GRANULOCYTES NFR BLD: 0.3 %
KETONES UR STRIP.AUTO-MCNC: NEGATIVE MG/DL
LEUKOCYTE ESTERASE UR QL STRIP.AUTO: NEGATIVE
LYMPHOCYTES # BLD AUTO: 2.61 X10(3) UL (ref 1–4)
LYMPHOCYTES NFR BLD AUTO: 25 %
MAGNESIUM SERPL-MCNC: 1.8 MG/DL (ref 1.6–2.6)
MCH RBC QN AUTO: 32.3 PG (ref 26–34)
MCHC RBC AUTO-ENTMCNC: 36.4 G/DL (ref 31–37)
MCV RBC AUTO: 88.7 FL
MDMA UR QL SCN: NEGATIVE
MONOCYTES # BLD AUTO: 0.73 X10(3) UL (ref 0.1–1)
MONOCYTES NFR BLD AUTO: 7 %
NEUTROPHILS # BLD AUTO: 6.89 X10 (3) UL (ref 1.5–7.7)
NEUTROPHILS # BLD AUTO: 6.89 X10(3) UL (ref 1.5–7.7)
NEUTROPHILS NFR BLD AUTO: 66.1 %
NITRITE UR QL STRIP.AUTO: NEGATIVE
OPIATES UR QL SCN: NEGATIVE
OSMOLALITY SERPL CALC.SUM OF ELEC: 287 MOSM/KG (ref 275–295)
OXYCODONE UR QL SCN: NEGATIVE
P AXIS: 73 DEGREES
P-R INTERVAL: 154 MS
PH UR STRIP.AUTO: 7 [PH] (ref 5–8)
PLATELET # BLD AUTO: 295 10(3)UL (ref 150–450)
POTASSIUM SERPL-SCNC: 3.4 MMOL/L (ref 3.5–5.1)
PROT SERPL-MCNC: 7.1 G/DL (ref 6.4–8.2)
PROT UR STRIP.AUTO-MCNC: NEGATIVE MG/DL
Q-T INTERVAL: 406 MS
QRS DURATION: 100 MS
QTC CALCULATION (BEZET): 444 MS
R AXIS: 91 DEGREES
RBC # BLD AUTO: 4.43 X10(6)UL
RBC UR QL AUTO: NEGATIVE
SODIUM SERPL-SCNC: 139 MMOL/L (ref 136–145)
SP GR UR STRIP.AUTO: 1.01 (ref 1–1.03)
T AXIS: 69 DEGREES
UROBILINOGEN UR STRIP.AUTO-MCNC: 0.2 MG/DL
VENTRICULAR RATE: 72 BPM
WBC # BLD AUTO: 10.4 X10(3) UL (ref 4–11)

## 2023-01-28 PROCEDURE — 96360 HYDRATION IV INFUSION INIT: CPT

## 2023-01-28 PROCEDURE — 70450 CT HEAD/BRAIN W/O DYE: CPT | Performed by: EMERGENCY MEDICINE

## 2023-01-28 PROCEDURE — 83735 ASSAY OF MAGNESIUM: CPT | Performed by: EMERGENCY MEDICINE

## 2023-01-28 PROCEDURE — 80307 DRUG TEST PRSMV CHEM ANLYZR: CPT | Performed by: EMERGENCY MEDICINE

## 2023-01-28 PROCEDURE — 93005 ELECTROCARDIOGRAM TRACING: CPT

## 2023-01-28 PROCEDURE — 82962 GLUCOSE BLOOD TEST: CPT

## 2023-01-28 PROCEDURE — 81025 URINE PREGNANCY TEST: CPT

## 2023-01-28 PROCEDURE — 85025 COMPLETE CBC W/AUTO DIFF WBC: CPT | Performed by: EMERGENCY MEDICINE

## 2023-01-28 PROCEDURE — 80053 COMPREHEN METABOLIC PANEL: CPT | Performed by: EMERGENCY MEDICINE

## 2023-01-28 PROCEDURE — 81003 URINALYSIS AUTO W/O SCOPE: CPT | Performed by: EMERGENCY MEDICINE

## 2023-01-28 RX ORDER — POTASSIUM CHLORIDE 20 MEQ/1
40 TABLET, EXTENDED RELEASE ORAL ONCE
Status: COMPLETED | OUTPATIENT
Start: 2023-01-28 | End: 2023-01-28

## 2023-01-28 RX ORDER — MECLIZINE HYDROCHLORIDE 25 MG/1
25 TABLET ORAL 3 TIMES DAILY PRN
Qty: 15 TABLET | Refills: 0 | Status: SHIPPED | OUTPATIENT
Start: 2023-01-28

## 2023-01-28 RX ORDER — MECLIZINE HYDROCHLORIDE 25 MG/1
25 TABLET ORAL ONCE
Status: DISCONTINUED | OUTPATIENT
Start: 2023-01-28 | End: 2023-01-28

## 2023-01-28 NOTE — ED INITIAL ASSESSMENT (HPI)
Pt c/o rapid eye movement, feels out of it, pt states she has been having repetitive movements and she doesn't know why. Pt crying, states she does not know whats going on. Pt father passed away this past year.

## 2023-01-30 ENCOUNTER — PATIENT OUTREACH (OUTPATIENT)
Dept: CASE MANAGEMENT | Age: 28
End: 2023-01-30

## 2023-01-30 NOTE — PROGRESS NOTES
1st attempt; pt had recent ED visit, calling to offer PCP f/u apt (dc 1/28)      Dr. Kacey Milligan,  [address] [address] [facility] [phone]    LVM for pt if assisted still needed call [phone]

## 2023-03-03 ENCOUNTER — OFFICE VISIT (OUTPATIENT)
Dept: FAMILY MEDICINE CLINIC | Facility: CLINIC | Age: 28
End: 2023-03-03
Payer: COMMERCIAL

## 2023-03-03 VITALS
RESPIRATION RATE: 16 BRPM | SYSTOLIC BLOOD PRESSURE: 90 MMHG | HEIGHT: 63.5 IN | BODY MASS INDEX: 20.41 KG/M2 | WEIGHT: 116.63 LBS | DIASTOLIC BLOOD PRESSURE: 70 MMHG | HEART RATE: 70 BPM | TEMPERATURE: 98 F

## 2023-03-03 DIAGNOSIS — L70.9 ACNE, UNSPECIFIED ACNE TYPE: ICD-10-CM

## 2023-03-03 DIAGNOSIS — Z11.51 SCREENING FOR HUMAN PAPILLOMAVIRUS (HPV): ICD-10-CM

## 2023-03-03 DIAGNOSIS — Z00.00 ROUTINE GENERAL MEDICAL EXAMINATION AT A HEALTH CARE FACILITY: Primary | ICD-10-CM

## 2023-03-03 DIAGNOSIS — Z12.4 SCREENING FOR CERVICAL CANCER: ICD-10-CM

## 2023-03-03 PROCEDURE — 3078F DIAST BP <80 MM HG: CPT | Performed by: FAMILY MEDICINE

## 2023-03-03 PROCEDURE — 3008F BODY MASS INDEX DOCD: CPT | Performed by: FAMILY MEDICINE

## 2023-03-03 PROCEDURE — 3074F SYST BP LT 130 MM HG: CPT | Performed by: FAMILY MEDICINE

## 2023-03-03 PROCEDURE — 99395 PREV VISIT EST AGE 18-39: CPT | Performed by: FAMILY MEDICINE

## 2023-03-03 RX ORDER — NORGESTIMATE AND ETHINYL ESTRADIOL 7DAYSX3 28
1 KIT ORAL DAILY
Qty: 3 EACH | Refills: 3 | Status: SHIPPED | OUTPATIENT
Start: 2023-03-03

## 2023-04-16 ENCOUNTER — PATIENT MESSAGE (OUTPATIENT)
Dept: FAMILY MEDICINE CLINIC | Facility: CLINIC | Age: 28
End: 2023-04-16

## 2023-04-17 NOTE — TELEPHONE ENCOUNTER
From: Kimberly Dooley  To: Claribel Washington MD  Sent: 4/16/2023 2:09 PM CDT  Subject: Birth Control Prescription    Hi Dr. Vandana Spangler,    I tried to fill my birth control prescription, and they can't seem to be able to get [facility] in [facility]. Could you please allow for them to fill it with the generic version? Or maybe send the prescription to [facility] on [address] to be filled instead?     Thanks,  [employer]

## 2023-06-20 NOTE — TELEPHONE ENCOUNTER
Patient's dad would like to know if Dr. Emery Elizabeth can do a complete allergy blood panel for patient or will patient need to be referred out to someone else? ZP=506 bpm, BMCU=380/88 mmhg, YgY3=416.0 %, Resp=24 B/min, EtCO2=38 mmHg, Apnea=2 Seconds, Pain=0, Gely=10, Kirkpatrick=2

## 2024-02-12 ENCOUNTER — OFFICE VISIT (OUTPATIENT)
Dept: FAMILY MEDICINE CLINIC | Facility: CLINIC | Age: 29
End: 2024-02-12
Payer: COMMERCIAL

## 2024-02-12 VITALS
HEART RATE: 60 BPM | DIASTOLIC BLOOD PRESSURE: 80 MMHG | TEMPERATURE: 97 F | BODY MASS INDEX: 21 KG/M2 | OXYGEN SATURATION: 99 % | RESPIRATION RATE: 16 BRPM | WEIGHT: 121 LBS | SYSTOLIC BLOOD PRESSURE: 100 MMHG

## 2024-02-12 DIAGNOSIS — K62.5 BRBPR (BRIGHT RED BLOOD PER RECTUM): Primary | ICD-10-CM

## 2024-02-12 RX ORDER — TAZAROTENE 0.45 MG/G
1 LOTION TOPICAL NIGHTLY
COMMUNITY

## 2024-02-12 RX ORDER — DAPSONE 75 MG/G
1 GEL TOPICAL DAILY
COMMUNITY
Start: 2023-04-06

## 2024-02-12 RX ORDER — HYDROCORTISONE ACETATE 25 MG/1
25 SUPPOSITORY RECTAL DAILY
Qty: 5 EACH | Refills: 0 | Status: SHIPPED | OUTPATIENT
Start: 2024-02-12

## 2024-02-12 NOTE — PROGRESS NOTES
Chief Complaint   Patient presents with    Blood In Stool     X5 months        HPI:  Presents with approx 4-5 month history of intermittent bright red blood on stool or sometimes on toilet paper after wiping. Reports this occurs about once a week and typically lasts 2 days once it starts. Reports does have frequent rectal itching, even when rectal bleeding is not present. Denies rectal pain, masses, burning. Reports is constipated at times. Denies fever/chills, nausea, emesis, diarrhea, bloody/dark tarry stools, BRBPR, poor appetite. Has been treating with non-steroidal eczema cream without relief.     Past Medical History:   Diagnosis Date    Acne        Patient Active Problem List   Diagnosis    Contact dermatitis    Acne, unspecified acne type    Nipple discharge       Current Outpatient Medications   Medication Sig Dispense Refill    Dapsone 7.5 % External Gel Apply 1 Application topically daily.      ARAZLO 0.045 % External Lotion Apply 1 Application topically nightly. AT BEDTIME      hydrocortisone 25 MG Rectal Suppos Place 1 suppository (25 mg total) rectally daily. 5 each 0    Norgestim-Eth Estrad Triphasic (TRI FEMYNOR) 0.18/0.215/0.25 MG-35 MCG Oral Tab Take 1 tablet by mouth daily. 3 each 3    spironolactone 50 MG Oral Tab TAKE 1 TABLET BY MOUTH TWICE A DAY 60 tablet 1    Tretinoin 0.1 % External Cream Apply a very thin layer to the face at night, followed by oil-free moisturizer 45 g 2    KETOCONAZOLE 2 % External Shampoo WASH SCALP EVERY OTHER DAY, LATHER, LEAVE ON FOR 5 MINUTES THEN RINSE. 120 mL 3       Physical Exam  /80   Pulse 60   Temp 97.4 °F (36.3 °C)   Resp 16   Wt 121 lb (54.9 kg)   LMP 01/30/2024 (Exact Date)   SpO2 99%   BMI 21.10 kg/m²   Constitutional: well developed, well nourished, in no apparent distress  HEENT: Normocephalic and atraumatic  Abd: soft, non-distended,  Rectal: normal appearance, rectal area is not excoriated, erythematous, edematous. No masses, hemorrhoids,  fissures appreciated. Internal exam deferred.  Skin: Skin is warm and dry. No rash noted. No erythema. No pallor.     A/P:    Encounter Diagnosis   Name Primary?    BRBPR (bright red blood per rectum)- likely hemorrhoid. Hydrocortisone supp. Medication administration, use and side effects discussed. Referred to Dr. Packer for further eval. Verbalized understanding of instructions and agreeable to this plan of care.    Yes       No orders of the defined types were placed in this encounter.      Meds & Refills for this Visit:  Requested Prescriptions     Signed Prescriptions Disp Refills    hydrocortisone 25 MG Rectal Suppos 5 each 0     Sig: Place 1 suppository (25 mg total) rectally daily.       Imaging & Consults:  SURGERY - INTERNAL    No follow-ups on file.  There are no Patient Instructions on file for this visit.    All questions were answered and the patient understands the plan.

## 2024-03-04 DIAGNOSIS — N92.6 IRREGULAR MENSES: ICD-10-CM

## 2024-03-05 RX ORDER — NORGESTIMATE AND ETHINYL ESTRADIOL 7DAYSX3 28
1 KIT ORAL DAILY
Qty: 84 TABLET | Refills: 0 | Status: SHIPPED | OUTPATIENT
Start: 2024-03-05

## 2024-03-05 NOTE — TELEPHONE ENCOUNTER
Requested Prescriptions     Pending Prescriptions Disp Refills    TRI-SPRINTEC 0.18/0.215/0.25 MG-35 MCG Oral Tab [Pharmacy Med Name: TRI-SPRINTEC TABLETS 28S] 84 tablet 0     Sig: TAKE 1 TABLET BY MOUTH DAILY     LOV 2/12/2024     Patient was asked to follow-up in: Follow-up not documented in note    Appointment scheduled: Visit date not found     Medication refilled per protocol.

## 2024-04-07 ENCOUNTER — TELEMEDICINE (OUTPATIENT)
Dept: TELEHEALTH | Age: 29
End: 2024-04-07
Payer: COMMERCIAL

## 2024-04-07 DIAGNOSIS — J01.40 ACUTE NON-RECURRENT PANSINUSITIS: Primary | ICD-10-CM

## 2024-04-07 PROCEDURE — 99213 OFFICE O/P EST LOW 20 MIN: CPT | Performed by: NURSE PRACTITIONER

## 2024-04-07 RX ORDER — AMOXICILLIN AND CLAVULANATE POTASSIUM 875; 125 MG/1; MG/1
1 TABLET, FILM COATED ORAL 2 TIMES DAILY
Qty: 20 TABLET | Refills: 0 | Status: SHIPPED | OUTPATIENT
Start: 2024-04-07 | End: 2024-04-17

## 2024-04-07 NOTE — PROGRESS NOTES
CHIEF COMPLAINT:   \" Sinus Infection \"  HPI:   Tressa Osman is a 28 year old female.  Patient presents via Video Visit on Demand.  Patient consents to conducting the visit virtually and acknowledges limitations without an in person examination.  Patient states that over 2 1/2 weeks ago she developed URI sx with nasal and sinus congestion and a scratchy throat.  Symptoms progressed to 'severe' Frontal and Maxillary sinus congestion and pressure.  Pt has PND.  Ears are clear.   Pt has a cough to clear the PND only.  Patient states that he has difficulty sleeping due to not being able to breath through her nose and awakens often.  Pt feels ill and fatigued.  No known fever.  Pt has been taking OTC products with no relief.  LMP approx 3/24/24    Current Outpatient Medications   Medication Sig Dispense Refill    amoxicillin clavulanate 875-125 MG Oral Tab Take 1 tablet by mouth 2 (two) times daily for 10 days. 20 tablet 0    TRI-SPRINTEC 0.18/0.215/0.25 MG-35 MCG Oral Tab TAKE 1 TABLET BY MOUTH DAILY 84 tablet 0    Dapsone 7.5 % External Gel Apply 1 Application topically daily.      ARAZLO 0.045 % External Lotion Apply 1 Application topically nightly. AT BEDTIME      hydrocortisone 25 MG Rectal Suppos Place 1 suppository (25 mg total) rectally daily. 5 each 0    spironolactone 50 MG Oral Tab TAKE 1 TABLET BY MOUTH TWICE A DAY 60 tablet 1    Tretinoin 0.1 % External Cream Apply a very thin layer to the face at night, followed by oil-free moisturizer 45 g 2    KETOCONAZOLE 2 % External Shampoo WASH SCALP EVERY OTHER DAY, LATHER, LEAVE ON FOR 5 MINUTES THEN RINSE. 120 mL 3      Past Medical History:   Diagnosis Date    Acne       History reviewed. No pertinent surgical history.   Family History   Problem Relation Age of Onset    Hypertension Father     Other (Other) Father          suddenly from acute pancreatitis in     Mental Disorder Maternal Grandmother         depression    Heart Disease Maternal  Grandfather       Social History     Socioeconomic History    Marital status: Single   Tobacco Use    Smoking status: Never    Smokeless tobacco: Never   Vaping Use    Vaping Use: Never used   Substance and Sexual Activity    Alcohol use: Yes     Comment: 2 x a month    Drug use: No    Sexual activity: Never   Other Topics Concern    Caffeine Concern Yes     Comment: 1 cup of coffee    Exercise Yes     Comment: walking    Seat Belt Yes    Self-Exams No         REVIEW OF SYSTEMS:   GENERAL:  See HPI  SKIN: no c/o rashes or abnormal skin lesions  HEENT: See HPI  LUNGS: no shortness of breath or wheezing, See HPI  CARDIOVASCULAR: no c/o chest pain or palpitations   GI: no c/o N/V/C or abdominal pain  NEURO: severe facial pain and + frontal sinus pressure headaches    EXAM:     Video Visit on Demand    GENERAL: well developed, well nourished.  Pt is ill-appearing and appears uncomfortable due to sinus pressure pain.  Non-toxic appearing.   Patient is still very pleasant and is able to provide an excellent HPI and ROS.  No windedness with speaking.   No accessory use.  No cough throughout the duration of the visit.    ASSESSMENT AND PLAN:   Tressa Osman is a 28 year old female who presents with     Acute non-recurrent pansinusitis    - amoxicillin clavulanate 875-125 MG Oral Tab; Take 1 tablet by mouth 2 (two) times daily for 10 days.  Dispense: 20 tablet; Refill: 0  - recommended NeilMed sinus rinse bid until resolved.  - Haile's Sinus Severe 12 hour (or generic) to use HS prn nasal congestion  - continue supportive measures    To seek further evaluation if not improving with each day.      Consent given by patient to conduct the Video Visit.  Approx 10 minutes spend in direct patient evaluation.

## 2024-04-24 ENCOUNTER — TELEMEDICINE (OUTPATIENT)
Dept: TELEHEALTH | Age: 29
End: 2024-04-24
Payer: COMMERCIAL

## 2024-04-24 DIAGNOSIS — J01.41 ACUTE RECURRENT PANSINUSITIS: Primary | ICD-10-CM

## 2024-04-24 PROCEDURE — 99212 OFFICE O/P EST SF 10 MIN: CPT | Performed by: PHYSICIAN ASSISTANT

## 2024-04-24 RX ORDER — DOXYCYCLINE HYCLATE 100 MG/1
100 CAPSULE ORAL 2 TIMES DAILY
Qty: 14 CAPSULE | Refills: 0 | Status: SHIPPED | OUTPATIENT
Start: 2024-04-24 | End: 2024-05-01

## 2024-04-24 NOTE — PROGRESS NOTES
Virtual/Telephone Check-In    Tressa Osman verbally consents to a Virtual/Telephone Check-In service on 04/24/24.  Patient has been referred to the [facility] website at [email or web address] to review the yearly Consent to Treat document.  Patient understands and accepts financial responsibility for any deductible, co-insurance and/or co-pays associated with this service.       Telehealth Verbal Consent   I conducted a telehealth visit with Tressa Osman today, 04/24/24, which was completed using two-way, real-time interactive audio and video communication. This has been done in good merissa to provide continuity of care in the best interest of the provider-patient relationship, due to the COVID - public health crisis/national emergency where restrictions of face-to-face office visits are ongoing. Every conscious effort was taken to allow for sufficient and adequate time to complete the visit.  The patient was made aware of the limitations of the telehealth visit, including treatment limitations as no physical exam could be performed.  The patient was advised to call 911 or to go to the ER in case there was an emergency.  The patient was also advised of the potential privacy & security concerns related to the telehealth platform.   The patient was made aware of where to find [facility]'s notice of privacy practices, telehealth consent form and other related consent forms and documents.  which are located on the [facility] website. The patient verbally agreed to telehealth consent form, related consents and the risks discussed.    Lastly, the patient confirmed that they were in [place].   Included in this visit, time may have been spent reviewing labs, medications, radiology tests and decision making. Appropriate medical decision-making and tests are ordered as detailed in the plan of care above.  Coding/billing information is submitted for this visit based on complexity of care and/or time spent for the visit.    CHIEF  COMPLAINT:  Chief Complaint   Patient presents with    Sinus Problem       HPI:  Tressa Osman is a 29 year old female who presents for a video visit.  Patient reports sinus congestion and pressure for 1 week. Pt was diagnosed with sinus infection 2.5 weeks ago and was started on 10 day course of Augmentin. Pt finished abx and states symptoms never fully resolved. Had residual nasal/sinus congestion for 1 week that worsened this morning. Now reports worsening discomfort and pressure to forehead and cheeks, post nasal drainage, sinus headache.   Patient denies fever, dental pain or chills.  Patient has tried sinus rinse for symptoms, which has not seemed to help.     Current Outpatient Medications   Medication Sig Dispense Refill    doxycycline 100 MG Oral Cap Take 1 capsule (100 mg total) by mouth 2 (two) times daily for 7 days. 14 capsule 0    TRI-SPRINTEC 0.18/0.215/0.25 MG-35 MCG Oral Tab TAKE 1 TABLET BY MOUTH DAILY 84 tablet 0    Dapsone 7.5 % External Gel Apply 1 Application topically daily.      ARAZLO 0.045 % External Lotion Apply 1 Application topically nightly. AT BEDTIME      hydrocortisone 25 MG Rectal Suppos Place 1 suppository (25 mg total) rectally daily. 5 each 0    spironolactone 50 MG Oral Tab TAKE 1 TABLET BY MOUTH TWICE A DAY 60 tablet 1    Tretinoin 0.1 % External Cream Apply a very thin layer to the face at night, followed by oil-free moisturizer 45 g 2    KETOCONAZOLE 2 % External Shampoo WASH SCALP EVERY OTHER DAY, LATHER, LEAVE ON FOR 5 MINUTES THEN RINSE. 120 mL 3     Past Medical History:    Acne     No past surgical history on file.    Social History     Socioeconomic History    Marital status: Single   Tobacco Use    Smoking status: Never    Smokeless tobacco: Never   Vaping Use    Vaping status: Never Used   Substance and Sexual Activity    Alcohol use: Yes     Comment: 2 x a month    Drug use: No    Sexual activity: Never   Other Topics Concern    Caffeine Concern Yes     Comment: 1  cup of coffee    Exercise Yes     Comment: walking    Seat Belt Yes    Self-Exams No        REVIEW OF SYSTEMS:  GENERAL: ok appetite  SKIN: no rashes or abnormal skin lesions  HEENT: See HPI  LUNGS: denies shortness of breath or wheezing, See HPI  CARDIOVASCULAR: denies chest pain or palpitations   NEURO: + sinus headaches    EXAM:  General: Alert, Ill-appearing/sounding, and In no acute distress  Respiratory:   Speaking in full sentences comfortably  Normal work of breathing  No cough during visit  Head: Normocephalic, +pressure and discomfort with palpation to frontal and maxillary sinuses per pt  Nose: No obvious nasal discharge.  Skin: No obvious rashes or lesions from what observed.     No results found for this or any previous visit (from the past 24 hour(s)).    ASSESSMENT AND PLAN:  Tressa Osman is a 29 year old female who presents with symptoms that are consistent with    ASSESSMENT:   Encounter Diagnosis   Name Primary?    Acute recurrent pansinusitis Yes       PLAN: Meds as below.  See patient Instructions    Meds & Refills for this Visit:  Requested Prescriptions     Signed Prescriptions Disp Refills    doxycycline 100 MG Oral Cap 14 capsule 0     Sig: Take 1 capsule (100 mg total) by mouth 2 (two) times daily for 7 days.       Risks, benefits, and side effects of medication explained and discussed.    There are no Patient Instructions on file for this visit.    The patient indicates understanding of these issues and agrees to the plan.  The patient is asked to f/u with PCP if sx's persist or worsen.    Tressa Osman understands video visit evaluation is not a substitute for face-to-face examination or emergency care. Patient advised to go to ER or call 911 for worsening symptoms or acute distress.       Attending Only

## 2024-05-08 ENCOUNTER — OFFICE VISIT (OUTPATIENT)
Facility: LOCATION | Age: 29
End: 2024-05-08
Payer: COMMERCIAL

## 2024-05-08 VITALS — TEMPERATURE: 98 F | HEART RATE: 82 BPM

## 2024-05-08 DIAGNOSIS — K64.1 GRADE II HEMORRHOIDS: Primary | ICD-10-CM

## 2024-05-08 PROCEDURE — 99203 OFFICE O/P NEW LOW 30 MIN: CPT | Performed by: STUDENT IN AN ORGANIZED HEALTH CARE EDUCATION/TRAINING PROGRAM

## 2024-05-08 PROCEDURE — 46600 DIAGNOSTIC ANOSCOPY SPX: CPT | Performed by: STUDENT IN AN ORGANIZED HEALTH CARE EDUCATION/TRAINING PROGRAM

## 2024-05-08 NOTE — PROCEDURES
Procedure: Anoscopy    The patient was draped and exposed in the usual fashion.    External visualization of the perineum and gluteal cleft was performed.    Digital exam was performed with a well lubricated examining finger.    Diagnostic anoscopy was performed with lubrication.    The anal canal was well visualized.    All findings are listed in the physical exam section of this note.    Eryn Packer MD

## 2024-05-08 NOTE — H&P
Patient ID: Tressa Osman is a 29 year old female.    Chief Complaint   Patient presents with    New Patient     NP - Bright red blood of rectum, Ref by NP Haile Coleman, Pt. States rectal bleeding with bowel movements, Pt. Denies rectal bleeding today, Pt. States has been experiencing rectal bleeding on and off for 9 months, Pt. Denies pain, Pt. States itchiness.        HPI: Tressa Osman is a 29 year old female presents to clinic for evaluation.  Patient reports seeing blood per rectum for a while now.  Her last episode was last week.  She states that she sometimes have hard stools but denies any constipation or diarrhea.  She drinks plenty of water but states that she could eat more fiber.  She also reports some pruritus at her anus.  She denies any other symptoms.  She denies any family history of colon cancer or colon polyps.    Workup: None    Past Medical History  Past Medical History:    Acne       Past Surgical History  No past surgical history on file.    Medications  Current Outpatient Medications   Medication Sig Dispense Refill    hydrocortisone 2.5 % External Ointment Apply 1 Application topically 2 (two) times daily for 7 days. 1 each 0    TRI-SPRINTEC 0.18/0.215/0.25 MG-35 MCG Oral Tab TAKE 1 TABLET BY MOUTH DAILY 84 tablet 0    Dapsone 7.5 % External Gel Apply 1 Application topically daily.      ARAZLO 0.045 % External Lotion Apply 1 Application topically nightly. AT BEDTIME      hydrocortisone 25 MG Rectal Suppos Place 1 suppository (25 mg total) rectally daily. 5 each 0    spironolactone 50 MG Oral Tab TAKE 1 TABLET BY MOUTH TWICE A DAY 60 tablet 1    Tretinoin 0.1 % External Cream Apply a very thin layer to the face at night, followed by oil-free moisturizer 45 g 2    KETOCONAZOLE 2 % External Shampoo WASH SCALP EVERY OTHER DAY, LATHER, LEAVE ON FOR 5 MINUTES THEN RINSE. 120 mL 3       Allergies  Allergies   Allergen Reactions    Barley ANAPHYLAXIS and Tightness in Throat       Social  History  History   Smoking Status    Never   Smokeless Tobacco    Never     History   Alcohol Use    Yes     Comment: 2 x a month     History   Drug Use No       Family History  Family History   Problem Relation Age of Onset    Hypertension Father     Other (Other) Father          suddenly from acute pancreatitis in     Mental Disorder Maternal Grandmother         depression    Heart Disease Maternal Grandfather        Review of Systems  Review of Systems   Constitutional: Negative.    Respiratory: Negative.     Cardiovascular: Negative.    Gastrointestinal:  Positive for blood in stool. Negative for constipation, diarrhea and rectal pain.       Exam  Vitals:    24 1352   Pulse: 82   Temp: 98.1 °F (36.7 °C)     Physical Exam  Exam conducted with a chaperone present.   Constitutional:       Appearance: Normal appearance.   Cardiovascular:      Rate and Rhythm: Normal rate.   Pulmonary:      Effort: Pulmonary effort is normal.   Genitourinary:     Comments: Visual anal exam: No external hemorrhoidal tissue, no abnormalities or other hard nodules on the  Digital rectal exam: No hard nodules palpated, no abnormalities, no blood on finger, hard stool in the vault  Anoscopy: Grade 1-2 hemorrhoids in all positions, no active bleeding, no other abnormalities    Skin:     General: Skin is warm and dry.   Neurological:      Mental Status: She is alert and oriented to person, place, and time.           Assessment/Plan  Assessment   Problem List Items Addressed This Visit    None  Visit Diagnoses       Grade II hemorrhoids    -  Primary    Relevant Medications    hydrocortisone 2.5 % External Ointment            Tressa Osman is a 29 year old female with grade 2 hemorrhoids    Patient reports a history of blood per rectum  She states that she intermittently has constipation and hard stools  On physical exam, patient had grade 1-2 hemorrhoids in all positions, no external hemorrhoidal tissue was noted  There was  hard stool palpated  I recommended trial of conservative treatment with increasing her fiber and water intake  I recommend that she takes Benefiber 3 times a day, she can mix with fluids as well as with soft foods  She should also drink plenty of water and limit her time on the toilet  Prescription for hydrocortisone cream also provided for the pruritus and burning sensation he is to use this as needed  Will have patient follow-up in 2 to 3 weeks for reevaluation  If she continues to see blood per rectum  at that time, we will plan for hemorrhoidal banding  Patient acknowledged understanding and agreed to the plan    She can call the office for any questions or concerns      Eryn Packer MD  General Surgery  [facility]     CC:  Dorothy Sampson MD

## 2024-05-10 ENCOUNTER — OFFICE VISIT (OUTPATIENT)
Dept: FAMILY MEDICINE CLINIC | Facility: CLINIC | Age: 29
End: 2024-05-10
Payer: COMMERCIAL

## 2024-05-10 VITALS
SYSTOLIC BLOOD PRESSURE: 100 MMHG | TEMPERATURE: 97 F | HEART RATE: 70 BPM | DIASTOLIC BLOOD PRESSURE: 72 MMHG | RESPIRATION RATE: 16 BRPM | BODY MASS INDEX: 21 KG/M2 | WEIGHT: 118 LBS

## 2024-05-10 DIAGNOSIS — B07.0 PLANTAR WART: Primary | ICD-10-CM

## 2024-05-10 PROCEDURE — 99213 OFFICE O/P EST LOW 20 MIN: CPT | Performed by: NURSE PRACTITIONER

## 2024-05-10 NOTE — PROGRESS NOTES
Chief Complaint   Patient presents with    Warts     Left middle toe        HPI:  Presents with approx 6 month history of small firm lesion to left 3rd toe, which is minimally painful. Denies injury to toe, redness, swelling to toe. Denies drainage or bleeding from area. Has not been treating with anything.     Past Medical History:    Acne       Patient Active Problem List   Diagnosis    Contact dermatitis    Acne, unspecified acne type    Nipple discharge       Current Outpatient Medications   Medication Sig Dispense Refill    hydrocortisone 2.5 % External Ointment Apply 1 Application topically 2 (two) times daily for 7 days. 1 each 0    TRI-SPRINTEC 0.18/0.215/0.25 MG-35 MCG Oral Tab TAKE 1 TABLET BY MOUTH DAILY 84 tablet 0    Dapsone 7.5 % External Gel Apply 1 Application topically daily.      spironolactone 50 MG Oral Tab TAKE 1 TABLET BY MOUTH TWICE A DAY 60 tablet 1    Tretinoin 0.1 % External Cream Apply a very thin layer to the face at night, followed by oil-free moisturizer 45 g 2    KETOCONAZOLE 2 % External Shampoo WASH SCALP EVERY OTHER DAY, LATHER, LEAVE ON FOR 5 MINUTES THEN RINSE. 120 mL 3       Physical Exam  /72   Pulse 70   Temp 97.4 °F (36.3 °C)   Resp 16   Wt 118 lb (53.5 kg)   LMP 04/28/2024 (Exact Date)   BMI 20.57 kg/m²   Constitutional: well developed, well nourished, in no apparent distress  HEENT: Normocephalic and atraumatic.   Cardiovascular: Normal rate.   Pulmonary/Chest: No respiratory distress. Effort normal.   Ext: left leg/foot w/o edema, erythema, limited ROM.   Skin: Skin is warm and dry. No rash noted. No erythema. No pallor. Left 3rd toe, distal aspect with 1mm, firm, minimally tender hyperkeratotic lesion w/o associated erythema, edema, bleeding or drainage.     A/P:    Encounter Diagnosis   Name Primary?    Plantar wart- cryotherapy employed in office today for 3 freeze/thaw cycles. Post-cryotherapy care discussed. RTO in 3 weeks for repeat therapy if lesion not  resolved, sooner if worsening or other issues. Verbalized understanding of instructions and agreeable to this plan of care.    Yes     Total visit time was 25 minutes, including 20 minutes of face to face visit time and 5 minutes of documentation and chart review.       No orders of the defined types were placed in this encounter.      Meds & Refills for this Visit:  Requested Prescriptions      No prescriptions requested or ordered in this encounter       Imaging & Consults:  None    No follow-ups on file.  Patient Instructions               Lesion will blister or scab over then next 3-5 days. Once this blister/scab has healed (another 2-6 days), apply over the counter wart remover medication (as per the  instructions).     All questions were answered and the patient understands the plan.

## 2024-05-10 NOTE — PATIENT INSTRUCTIONS
Lesion will blister or scab over then next 3-5 days. Once this blister/scab has healed (another 2-6 days), apply over the counter wart remover medication (as per the  instructions).

## 2024-06-19 ENCOUNTER — TELEPHONE (OUTPATIENT)
Dept: FAMILY MEDICINE CLINIC | Facility: CLINIC | Age: 29
End: 2024-06-19

## 2024-06-19 DIAGNOSIS — N92.6 IRREGULAR MENSES: ICD-10-CM

## 2024-06-19 NOTE — TELEPHONE ENCOUNTER
Requested Prescriptions     Pending Prescriptions Disp Refills    TRI-SPRINTEC 0.18/0.215/0.25 MG-35 MCG Oral Tab [Pharmacy Med Name: TRI-SPRINTEC TABLET] 28 tablet 0     Sig: TAKE 1 TABLET BY MOUTH EVERY DAY     LOV 5/10/2024     Patient was asked to follow-up in:     Appointment scheduled: Visit date not found     Medication refilled per protocol.    Routed to . Pt is due for annual physical

## 2024-06-22 DIAGNOSIS — N92.6 IRREGULAR MENSES: ICD-10-CM

## 2024-06-25 RX ORDER — NORGESTIMATE AND ETHINYL ESTRADIOL 7DAYSX3 28
1 KIT ORAL DAILY
Qty: 84 TABLET | Refills: 0 | Status: SHIPPED | OUTPATIENT
Start: 2024-06-25

## 2024-06-25 NOTE — TELEPHONE ENCOUNTER
Requested Prescriptions     Pending Prescriptions Disp Refills    Norgestim-Eth Estrad Triphasic (TRI-SPRINTEC) 0.18/0.215/0.25 MG-35 MCG Oral Tab 84 tablet 0     Sig: Take 1 tablet by mouth daily.     LOV 5/10/2024     Patient was asked to follow-up in: Follow-up not documented in note    Appointment scheduled: 9/27/2024 Dorothy Sampson MD     Medication refilled per protocol.

## 2024-07-02 RX ORDER — NORGESTIMATE AND ETHINYL ESTRADIOL 7DAYSX3 28
1 KIT ORAL DAILY
Qty: 84 TABLET | Refills: 0 | Status: SHIPPED | OUTPATIENT
Start: 2024-07-02

## 2024-09-03 ENCOUNTER — TELEPHONE (OUTPATIENT)
Dept: FAMILY MEDICINE CLINIC | Facility: CLINIC | Age: 29
End: 2024-09-03

## 2024-09-03 DIAGNOSIS — Z00.00 ROUTINE GENERAL MEDICAL EXAMINATION AT A HEALTH CARE FACILITY: Primary | ICD-10-CM

## 2024-09-03 NOTE — TELEPHONE ENCOUNTER
Please enter lab orders for the patient's upcoming physical appointment.     Physical scheduled:   Your appointments       Date & Time Appointment Department ([place])    Sep 27, 2024 1:00 PM CDT Adult Physical with Dorothy Sampson MD [facility] ([facility])    PLEASE NOTE - Most insurances allow a Complete Physical once every 366 days. Please schedule accordingly.    Please arrive 15 minutes prior to your scheduled appointment. Please also bring your Insurance card, Photo ID, and your medication bottles or a list of your current medication.    If you no longer require this appointment, please contact your physician office to cancel.              [facility] [address]  [address] [address]  [phone]           Preferred lab: [facility] ([facility])     The patient has been notified to complete fasting labs prior to their physical appointment.

## 2024-09-08 DIAGNOSIS — N92.6 IRREGULAR MENSES: ICD-10-CM

## 2024-09-09 RX ORDER — NORGESTIMATE AND ETHINYL ESTRADIOL 7DAYSX3 28
1 KIT ORAL DAILY
Qty: 84 TABLET | Refills: 0 | Status: SHIPPED | OUTPATIENT
Start: 2024-09-09

## 2024-09-09 NOTE — TELEPHONE ENCOUNTER
Requested Prescriptions     Pending Prescriptions Disp Refills    NORGESTIM-ETH ESTRAD TRIPHASIC 0.18/0.215/0.25 MG-35 MCG Oral Tab [Pharmacy Med Name: NORG-EE 0.18-0.215-0.25/0.035] 84 tablet 0     Sig: TAKE 1 TABLET BY MOUTH EVERY DAY     LOV 5/10/2024     Patient was asked to follow-up in: Follow-up not documented in note    Appointment scheduled: 9/27/2024 Dorothy Sampson MD     Medication refilled per protocol.

## 2024-09-23 ENCOUNTER — LAB ENCOUNTER (OUTPATIENT)
Dept: LAB | Age: 29
End: 2024-09-23
Attending: FAMILY MEDICINE
Payer: COMMERCIAL

## 2024-09-23 DIAGNOSIS — Z00.00 ROUTINE GENERAL MEDICAL EXAMINATION AT A HEALTH CARE FACILITY: ICD-10-CM

## 2024-09-23 LAB
ALBUMIN SERPL-MCNC: 4.7 G/DL (ref 3.2–4.8)
ALBUMIN/GLOB SERPL: 1.7 {RATIO} (ref 1–2)
ALP LIVER SERPL-CCNC: 43 U/L
ALT SERPL-CCNC: 14 U/L
ANION GAP SERPL CALC-SCNC: 6 MMOL/L (ref 0–18)
AST SERPL-CCNC: 21 U/L (ref ?–34)
BILIRUB SERPL-MCNC: 0.6 MG/DL (ref 0.3–1.2)
BUN BLD-MCNC: 9 MG/DL (ref 9–23)
CALCIUM BLD-MCNC: 10.1 MG/DL (ref 8.7–10.4)
CHLORIDE SERPL-SCNC: 105 MMOL/L (ref 98–112)
CHOLEST SERPL-MCNC: 183 MG/DL (ref ?–200)
CO2 SERPL-SCNC: 26 MMOL/L (ref 21–32)
CREAT BLD-MCNC: 0.89 MG/DL
EGFRCR SERPLBLD CKD-EPI 2021: 91 ML/MIN/1.73M2 (ref 60–?)
ERYTHROCYTE [DISTWIDTH] IN BLOOD BY AUTOMATED COUNT: 12.7 %
FASTING PATIENT LIPID ANSWER: YES
FASTING STATUS PATIENT QL REPORTED: YES
GLOBULIN PLAS-MCNC: 2.8 G/DL (ref 2–3.5)
GLUCOSE BLD-MCNC: 81 MG/DL (ref 70–99)
HCT VFR BLD AUTO: 42.9 %
HDLC SERPL-MCNC: 72 MG/DL (ref 40–59)
HGB BLD-MCNC: 14.6 G/DL
LDLC SERPL CALC-MCNC: 89 MG/DL (ref ?–100)
MCH RBC QN AUTO: 31.6 PG (ref 26–34)
MCHC RBC AUTO-ENTMCNC: 34 G/DL (ref 31–37)
MCV RBC AUTO: 92.9 FL
NONHDLC SERPL-MCNC: 111 MG/DL (ref ?–130)
OSMOLALITY SERPL CALC.SUM OF ELEC: 282 MOSM/KG (ref 275–295)
PLATELET # BLD AUTO: 302 10(3)UL (ref 150–450)
POTASSIUM SERPL-SCNC: 4.3 MMOL/L (ref 3.5–5.1)
PROT SERPL-MCNC: 7.5 G/DL (ref 5.7–8.2)
RBC # BLD AUTO: 4.62 X10(6)UL
SODIUM SERPL-SCNC: 137 MMOL/L (ref 136–145)
TRIGL SERPL-MCNC: 126 MG/DL (ref 30–149)
TSI SER-ACNC: 3.15 MIU/ML (ref 0.55–4.78)
VLDLC SERPL CALC-MCNC: 20 MG/DL (ref 0–30)
WBC # BLD AUTO: 6.1 X10(3) UL (ref 4–11)

## 2024-09-23 PROCEDURE — 85027 COMPLETE CBC AUTOMATED: CPT

## 2024-09-23 PROCEDURE — 84443 ASSAY THYROID STIM HORMONE: CPT

## 2024-09-23 PROCEDURE — 80061 LIPID PANEL: CPT

## 2024-09-23 PROCEDURE — 80053 COMPREHEN METABOLIC PANEL: CPT

## 2024-09-23 PROCEDURE — 36415 COLL VENOUS BLD VENIPUNCTURE: CPT

## 2024-09-27 ENCOUNTER — OFFICE VISIT (OUTPATIENT)
Dept: FAMILY MEDICINE CLINIC | Facility: CLINIC | Age: 29
End: 2024-09-27
Payer: COMMERCIAL

## 2024-09-27 VITALS
WEIGHT: 121.19 LBS | HEART RATE: 82 BPM | RESPIRATION RATE: 16 BRPM | SYSTOLIC BLOOD PRESSURE: 112 MMHG | DIASTOLIC BLOOD PRESSURE: 70 MMHG | HEIGHT: 63.5 IN | BODY MASS INDEX: 21.21 KG/M2 | TEMPERATURE: 98 F

## 2024-09-27 DIAGNOSIS — Z00.00 ROUTINE GENERAL MEDICAL EXAMINATION AT A HEALTH CARE FACILITY: ICD-10-CM

## 2024-09-27 DIAGNOSIS — N92.6 IRREGULAR MENSES: ICD-10-CM

## 2024-09-27 DIAGNOSIS — Z23 NEED FOR INFLUENZA VACCINATION: ICD-10-CM

## 2024-09-27 DIAGNOSIS — L30.9 ECZEMA, UNSPECIFIED TYPE: ICD-10-CM

## 2024-09-27 PROCEDURE — 90656 IIV3 VACC NO PRSV 0.5 ML IM: CPT | Performed by: FAMILY MEDICINE

## 2024-09-27 PROCEDURE — 90471 IMMUNIZATION ADMIN: CPT | Performed by: FAMILY MEDICINE

## 2024-09-27 PROCEDURE — 99395 PREV VISIT EST AGE 18-39: CPT | Performed by: FAMILY MEDICINE

## 2024-09-27 RX ORDER — NORGESTIMATE AND ETHINYL ESTRADIOL 7DAYSX3 28
1 KIT ORAL DAILY
Qty: 84 TABLET | Refills: 0 | Status: CANCELLED | OUTPATIENT
Start: 2024-09-27

## 2024-09-30 RX ORDER — NORGESTIMATE AND ETHINYL ESTRADIOL 7DAYSX3 28
1 KIT ORAL DAILY
Qty: 84 TABLET | Refills: 3 | Status: SHIPPED | OUTPATIENT
Start: 2024-09-30

## 2025-05-20 NOTE — TELEPHONE ENCOUNTER
Sameera, Dr Mark is the ONLY provider in our office who places Kyleena, Mirena or Sammi.   Michael Quezada and Dr Mark place Nexplanon.     Please call the patient and get more detailed information on what IUD she is interested in. If she is not sure, she will need to schedule an appointment with Dr Mark to get more details.